# Patient Record
Sex: MALE | Race: OTHER | Employment: FULL TIME | URBAN - METROPOLITAN AREA
[De-identification: names, ages, dates, MRNs, and addresses within clinical notes are randomized per-mention and may not be internally consistent; named-entity substitution may affect disease eponyms.]

---

## 2019-04-24 ENCOUNTER — HOSPITAL ENCOUNTER (OUTPATIENT)
Dept: LAB | Age: 24
Discharge: HOME | End: 2019-04-24

## 2019-09-01 ENCOUNTER — APPOINTMENT (OUTPATIENT)
Dept: GENERAL RADIOLOGY | Age: 24
End: 2019-09-01
Attending: EMERGENCY MEDICINE
Payer: COMMERCIAL

## 2019-09-01 ENCOUNTER — HOSPITAL ENCOUNTER (EMERGENCY)
Age: 24
Discharge: HOME | End: 2019-09-01
Attending: EMERGENCY MEDICINE
Payer: COMMERCIAL

## 2019-09-01 VITALS
TEMPERATURE: 98.2 F | HEIGHT: 67 IN | BODY MASS INDEX: 23.81 KG/M2 | DIASTOLIC BLOOD PRESSURE: 68 MMHG | HEART RATE: 70 BPM | WEIGHT: 151.68 LBS | OXYGEN SATURATION: 98 % | RESPIRATION RATE: 12 BRPM | SYSTOLIC BLOOD PRESSURE: 125 MMHG

## 2019-09-01 DIAGNOSIS — S80.02XA CONTUSION OF LEFT KNEE, INITIAL ENCOUNTER: Primary | ICD-10-CM

## 2019-09-01 PROCEDURE — 73562 X-RAY EXAM OF KNEE 3: CPT

## 2019-09-01 PROCEDURE — 73610 X-RAY EXAM OF ANKLE: CPT

## 2019-09-01 PROCEDURE — 99282 EMERGENCY DEPT VISIT SF MDM: CPT

## 2019-09-01 RX ORDER — IBUPROFEN 600 MG/1
600 TABLET ORAL
Qty: 20 TAB | Refills: 0 | Status: SHIPPED | OUTPATIENT
Start: 2019-09-01

## 2019-09-01 NOTE — DISCHARGE INSTRUCTIONS
Patient Education        Contusion: Care Instructions  Your Care Instructions    Contusion is the medical term for a bruise. It is the result of a direct blow or an impact, such as a fall. Contusions are common sports injuries. Most people think of a bruise as a black-and-blue spot. This happens when small blood vessels get torn and leak blood under the skin. But bones, muscles, and organs can also get bruised. This may damage deep tissues but not cause a bruise you can see. The doctor will do a physical exam to find the location of your contusion. You may also have tests to make sure you do not have a more serious injury, such as a broken bone or nerve damage. These may include X-rays or other imaging tests like a CT scan or MRI. Deep-tissue contusions may cause pain and swelling. But if there is no serious damage, they will often get better in a few weeks with home treatment. The doctor has checked you carefully, but problems can develop later. If you notice any problems or new symptoms, get medical treatment right away. Follow-up care is a key part of your treatment and safety. Be sure to make and go to all appointments, and call your doctor if you are having problems. It's also a good idea to know your test results and keep a list of the medicines you take. How can you care for yourself at home? · Put ice or a cold pack on the sore area for 10 to 20 minutes at a time to stop swelling. Put a thin cloth between the ice pack and your skin. · Be safe with medicines. Read and follow all instructions on the label. ? If the doctor gave you a prescription medicine for pain, take it as prescribed. ? If you are not taking a prescription pain medicine, ask your doctor if you can take an over-the-counter medicine. · If you can, prop up the sore area on pillows as much as possible for the next few days. Try to keep the sore area above the level of your heart. When should you call for help?   Call your doctor now or seek immediate medical care if:    · Your pain gets worse.     · You have new or worse swelling.     · You have tingling, weakness, or numbness in the area near the contusion.     · The area near the contusion is cold or pale.    Watch closely for changes in your health, and be sure to contact your doctor if:    · You do not get better as expected. Where can you learn more? Go to http://haroon-gabby.info/. Enter O744 in the search box to learn more about \"Contusion: Care Instructions. \"  Current as of: September 23, 2018  Content Version: 12.1  © 5781-9435 Healthwise, [employer]. Care instructions adapted under license by [employer] (which disclaims liability or warranty for this information). If you have questions about a medical condition or this instruction, always ask your healthcare professional. [address] any warranty or liability for your use of this information.
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
## 2019-09-01 NOTE — ED NOTES
Left knee wrapped with an ace wrap as ordered. The patient was discharged home by Dr Basil Chi in stable condition. The patient is alert and oriented, in no respiratory distress and discharge vital signs obtained. The patient's diagnosis, condition and treatment were explained. The patient expressed understanding. One prescription given. A discharge plan has been developed. Aftercare instructions were given. Pt ambulatory out of the ED.

## 2020-08-02 ENCOUNTER — APPOINTMENT (OUTPATIENT)
Dept: GENERAL RADIOLOGY | Age: 25
End: 2020-08-02
Attending: EMERGENCY MEDICINE
Payer: COMMERCIAL

## 2020-08-02 ENCOUNTER — HOSPITAL ENCOUNTER (EMERGENCY)
Age: 25
Discharge: HOME | End: 2020-08-03
Attending: EMERGENCY MEDICINE
Payer: COMMERCIAL
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
## 2020-08-02 DIAGNOSIS — S41.111A ARM LACERATION, RIGHT, INITIAL ENCOUNTER: Primary | ICD-10-CM

## 2020-08-02 PROCEDURE — 74011000250 HC RX REV CODE- 250: Performed by: EMERGENCY MEDICINE

## 2020-08-02 PROCEDURE — 99283 EMERGENCY DEPT VISIT LOW MDM: CPT

## 2020-08-02 PROCEDURE — 73060 X-RAY EXAM OF HUMERUS: CPT

## 2020-08-02 PROCEDURE — 75810000294 HC INTERM/LAYERED WND RPR

## 2020-08-02 RX ORDER — LIDOCAINE HYDROCHLORIDE AND EPINEPHRINE 10; 10 MG/ML; UG/ML
1.5 INJECTION, SOLUTION INFILTRATION; PERINEURAL
Status: COMPLETED | OUTPATIENT
Start: 2020-08-02 | End: 2020-08-02

## 2020-08-02 RX ORDER — BACITRACIN 500 UNIT/G
1 PACKET (EA) TOPICAL
Status: COMPLETED | OUTPATIENT
Start: 2020-08-03 | End: 2020-08-03

## 2020-08-02 RX ADMIN — LIDOCAINE HYDROCHLORIDE,EPINEPHRINE BITARTRATE 15 MG: 10; .01 INJECTION, SOLUTION INFILTRATION; PERINEURAL at 22:59

## 2020-08-03 VITALS
DIASTOLIC BLOOD PRESSURE: 78 MMHG | OXYGEN SATURATION: 95 % | RESPIRATION RATE: 16 BRPM | HEIGHT: 71 IN | BODY MASS INDEX: 22.62 KG/M2 | WEIGHT: 161.6 LBS | SYSTOLIC BLOOD PRESSURE: 130 MMHG | TEMPERATURE: 98.2 F | HEART RATE: 98 BPM

## 2020-08-03 PROCEDURE — 74011000250 HC RX REV CODE- 250: Performed by: EMERGENCY MEDICINE

## 2020-08-03 RX ADMIN — BACITRACIN 1 PACKET: 500 OINTMENT TOPICAL at 00:15
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
## 2020-08-03 NOTE — ED NOTES
Dr. Dot De Leon reviewed discharge instructions with the patient. The patient verbalized understanding. Patient ambulated out of the emergency department escorted by his father. Patient is free of pain. Patient is in no apparent distress.

## 2020-08-03 NOTE — ED NOTES
Patient resting on the stretcher in no apparent distress. Patient has no pain. Patient comfortable in bed. Patient does not need to use the bathroom. Patient does not have IV fluids or medication running.       The patient was updated on the plan of care and all questions were answered. The patient's needs were assessed and the patient has no needs at this time. Patient's bed is in low position and locked, call bell and personal belongings are within reach and patient has been instructed on it's use. Area around bed is free of clutter and floor is clean.
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
## 2020-08-03 NOTE — ED NOTES
Patient resting on the stretcher in no apparent distress. Patient has no pain. Patient comfortable in bed. Patient does not need to use the bathroom. Patient does not have IV fluids or medication running. The patient was updated on the plan of care and all questions were answered. The patient's needs were assessed and the patient has no needs at this time. X-ray called for imaging. Patient's bed is in low position and locked, call bell and personal belongings are within reach and patient has been instructed on it's use. Area around bed is free of clutter and floor is clean.

## 2020-08-15 ENCOUNTER — HOSPITAL ENCOUNTER (EMERGENCY)
Age: 25
Discharge: HOME | End: 2020-08-15
Attending: EMERGENCY MEDICINE

## 2020-08-15 VITALS
RESPIRATION RATE: 16 BRPM | HEART RATE: 71 BPM | WEIGHT: 162.7 LBS | SYSTOLIC BLOOD PRESSURE: 135 MMHG | OXYGEN SATURATION: 100 % | DIASTOLIC BLOOD PRESSURE: 84 MMHG | BODY MASS INDEX: 22.69 KG/M2 | TEMPERATURE: 98.2 F

## 2020-08-15 DIAGNOSIS — Z48.02 ENCOUNTER FOR REMOVAL OF SUTURES: Primary | ICD-10-CM

## 2020-08-16 NOTE — ED NOTES
Patient given discharge papers and instructions by primary RN. Patient verbalized understanding and stated not having any questions or concerns regarding his care. Patient ambulatory out of ED with mother.
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
## 2020-08-16 NOTE — DISCHARGE INSTRUCTIONS
We hope that we have addressed all of your medical concerns. The examination and treatment you received in the Emergency Department were for an emergent problem and were not intended as complete care. It is important that you follow up with your healthcare provider(s) for ongoing care. If your symptoms worsen or do not improve as expected, and you are unable to reach your usual health care provider(s), you should return to the Emergency Department. Today's healthcare is undergoing tremendous change, and patient satisfaction surveys are one of the many tools to assess the quality of medical care. You may receive a survey from the [employer] regarding your experience in the Emergency Department. I hope that your experience has been completely positive, particularly the medical care that I provided. As such, please participate in the survey; anything less than excellent does not meet my expectations or intentions. [facility] and [address] [place] [address] participate in nationally recognized quality of care measures. If your blood pressure is greater than 120/80, as reported below, we urge that you seek medical care to address the potential of high blood pressure, commonly known as hypertension. Hypertension can be hereditary or can be caused by certain medical conditions, pain, stress, or \"white coat syndrome. \"       Please make an appointment with your health care provider(s) for follow up of your Emergency Department visit. VITALS:   Patient Vitals for the past 8 hrs:   Temp Pulse Resp BP SpO2   08/15/20 2009 98.2 °F (36.8 °C) 71 16 135/84 100 %          Thank you for allowing us to provide you with medical care today. We realize that you have many choices for your emergency care needs. Please choose us in the future for any continued health care needs. [employer]  Rick Mcdermott, [address]. Office: [phone]            No results found for this or any previous visit (from the past 24 hour(s)). No results found. Patient Education        Learning About Stitches and Staples Removal  When are stitches and staples removed? Your doctor will tell you when to have your stitches or staples removed, usually in 7 to 14 days. How long you'll be told to wait will depend on things like where the wound is located, how big and how deep the wound is, and what your general health is like. Do not remove the stitches on your own. Stitches on the face are usually removed within a week. But stitches and staples on other areas of the body, such as on the back or belly or over a joint, may need to stay in place longer, often a week or two. Be sure to follow your doctor's instructions. How are stitches and staples removed? It usually doesn't hurt when the doctor removes the stitches or staples. You may feel a tug as each stitch or staple is removed. · You will either be seated or lying down. · To remove stitches, the doctor will use scissors to cut each of the knots and then pull the threads out. · To remove staples, the doctor will use a tool to take out the staples one at a time. · The area may still feel tender after the stitches or staples are gone. But it should feel better within a few minutes or up to a few hours. What can you expect after stitches and staples are removed? Depending on the type and location of the cut, you will have a scar. Scars usually fade over time. Keep the area clean, but you won't need a bandage. When should you call for help? Call your doctor now or seek immediate medical care if :  · You have new pain, or your pain gets worse. · You have trouble moving the area near the scar. · You have symptoms of infection, such as:  ? Increased pain, swelling, warmth, or redness around the scar. ? Red streaks leading from the scar. ? Pus draining from the scar. ? A fever.   Watch closely for changes in your health, and be sure to contact your doctor if:   · The scar opens. · You do not get better as expected. Follow-up care is a key part of your treatment and safety. Be sure to make and go to all appointments, and call your doctor if you do not get better as expected. It's also a good idea to keep a list of the medicines you take. Where can you learn more? Go to http://www.gray.com/  Enter L659 in the search box to learn more about \"Learning About Stitches and Staples Removal.\"  Current as of: June 26, 2019               Content Version: 12.5  © 9158-1759 Healthwise[employer]. Care instructions adapted under license by [employer] (which disclaims liability or warranty for this information). If you have questions about a medical condition or this instruction, always ask your healthcare professional. [address] any warranty or liability for your use of this information.

## 2020-08-16 NOTE — ED PROVIDER NOTES
This is a 26-year-old male comes emergency room with chief complaint of suture removal.  Patient states that he had a laceration on August 2 that was done here. Patient comes in for removal.  Patient denies any redness. Patient denies any pain or swelling to the area. Patient denies any fever or chills. The history is provided by the patient. No  was used. Suture Removal   This is a new problem. The current episode started more than 1 week ago. The problem occurs constantly. The problem has not changed since onset. Pertinent negatives include no chest pain, no abdominal pain, no headaches and no shortness of breath. Nothing aggravates the symptoms. Nothing relieves the symptoms. He has tried nothing for the symptoms. Past Medical History:   Diagnosis Date    Psoriatic arthritis ([facility] [address]        Past Surgical History:   Procedure Laterality Date    HX KNEE ARTHROSCOPY           History reviewed. No pertinent family history.     Social History     Socioeconomic History    Marital status: SINGLE     Spouse name: Not on file    Number of children: Not on file    Years of education: Not on file    Highest education level: Not on file   Occupational History    Not on file   Social Needs    Financial resource strain: Not on file    Food insecurity     Worry: Not on file     Inability: Not on file    Transportation needs     Medical: Not on file     Non-medical: Not on file   Tobacco Use    Smoking status: Never Smoker    Smokeless tobacco: Never Used   Substance and Sexual Activity    Alcohol use: Never     Frequency: Never    Drug use: Never    Sexual activity: Not on file   Lifestyle    Physical activity     Days per week: Not on file     Minutes per session: Not on file    Stress: Not on file   Relationships    Social connections     Talks on phone: Not on file     Gets together: Not on file     Attends Tenriism service: Not on file     Active member of club or organization: Not on file     Attends meetings of clubs or organizations: Not on file     Relationship status: Not on file    Intimate partner violence     Fear of current or ex partner: Not on file     Emotionally abused: Not on file     Physically abused: Not on file     Forced sexual activity: Not on file   Other Topics Concern    Not on file   Social History Narrative    Not on file     ALLERGIES: Patient has no known allergies. Review of Systems   Constitutional: Negative for appetite change, chills, fever and unexpected weight change. HENT: Negative for ear pain, hearing loss, rhinorrhea and trouble swallowing. Eyes: Negative for pain and visual disturbance. Respiratory: Negative for cough, chest tightness and shortness of breath. Cardiovascular: Negative for chest pain and palpitations. Gastrointestinal: Negative for abdominal distention, abdominal pain, blood in stool and vomiting. Genitourinary: Negative for dysuria, hematuria and urgency. Musculoskeletal: Negative for back pain and myalgias. Skin: Positive for wound. Negative for rash. Neurological: Negative for dizziness, syncope, weakness, numbness and headaches. Psychiatric/Behavioral: Negative for confusion and suicidal ideas. All other systems reviewed and are negative. Vitals:    08/15/20 2009   BP: 135/84   Pulse: 71   Resp: 16   Temp: 98.2 °F (36.8 °C)   SpO2: 100%   Weight: 73.8 kg (162 lb 11.2 oz)            Physical Exam  Vitals signs and nursing note reviewed. Constitutional:       General: He is not in acute distress. Appearance: Normal appearance. He is well-developed. He is not diaphoretic. HENT:      Head: Normocephalic and atraumatic. Right Ear: External ear normal.      Left Ear: External ear normal.   Eyes:      General: No scleral icterus. Right eye: No discharge. Left eye: No discharge. Extraocular Movements: Extraocular movements intact.       Conjunctiva/sclera: Conjunctivae normal.      Pupils: Pupils are equal, round, and reactive to light. Neck:      Musculoskeletal: Normal range of motion and neck supple. Vascular: No JVD. Trachea: No tracheal deviation. Cardiovascular:      Rate and Rhythm: Normal rate and regular rhythm. Pulmonary:      Effort: Pulmonary effort is normal.      Breath sounds: No decreased breath sounds. Musculoskeletal: Normal range of motion. General: No tenderness. Skin:     General: Skin is warm and dry. Capillary Refill: Capillary refill takes less than 2 seconds. Coloration: Skin is not pale. Findings: Wound present. No erythema or rash. Neurological:      General: No focal deficit present. Mental Status: He is alert and oriented to person, place, and time. GCS: GCS eye subscore is 4. GCS verbal subscore is 5. GCS motor subscore is 6. Cranial Nerves: No cranial nerve deficit. Sensory: No sensory deficit. Motor: No abnormal muscle tone. Coordination: Coordination normal.      Deep Tendon Reflexes: Reflexes are normal and symmetric. Reflexes normal.   Psychiatric:         Mood and Affect: Mood normal.         Behavior: Behavior normal.         Thought Content:  Thought content normal.         Judgment: Judgment normal.          MDM  Number of Diagnoses or Management Options  Encounter for removal of sutures:   Risk of Complications, Morbidity, and/or Mortality  Presenting problems: moderate  Diagnostic procedures: low  Management options: low    Patient Progress  Patient progress: stable       Suture/Staple Removal    Date/Time: 8/15/2020 8:22 PM  Performed by: Aline Hawk DO  Authorized by: Aline Hawk DO     Consent:     Consent obtained:  Verbal    Consent given by:  Patient    Risks discussed:  Bleeding, pain and wound separation    Alternatives discussed:  No treatment  Location:     Location:  Upper extremity    Upper extremity location: Arm    Arm location:  R upper arm  Procedure details:     Wound appearance:  No signs of infection    Number of sutures removed:  7  Post-procedure details:     Post-removal:  Dressing applied    Patient tolerance of procedure: Tolerated well, no immediate complications (10 minutes)      Chief Complaint   Patient presents with    Suture Removal       The patient's presenting problems have been discussed, and they are in agreement with the care plan formulated and outlined with them. I have encouraged them to ask questions as they arise throughout their visit. MEDICATIONS GIVEN:  Medications - No data to display    LABS REVIEWED:  No results found for this or any previous visit (from the past 24 hour(s)). VITAL SIGNS:  Patient Vitals for the past 24 hrs:   Temp Pulse Resp BP SpO2   08/15/20 2009 98.2 °F (36.8 °C) 71 16 135/84 100 %       RADIOLOGY RESULTS:  The following have been ordered and reviewed:  No results found. PROCEDURES:  Suture removal right upper extremity. 7 sutures removed. PROGRESS NOTES:  Discussed results and plan with patient. Patient will be discharged home with PCP follow up. Patient instructed to return to the emergency room for any worsening symptoms or any other concerns. DIAGNOSIS:    1. Encounter for removal of sutures        PLAN:  Follow-up Information     Follow up With Specialties Details Why Contact Info    your doctor   As needed     [facility] [address] [facility]  If symptoms worsen [address] [id number] [phone]        Current Discharge Medication List      CONTINUE these medications which have NOT CHANGED    Details   lisdexamfetamine (VYVANSE) 50 mg cap Take  by mouth daily. ibuprofen (MOTRIN) 600 mg tablet Take 1 Tab by mouth every six (6) hours as needed for Pain. Qty: 20 Tab, Refills: 0             ED COURSE: The patient's hospital course has been uncomplicated.

## 2020-10-30 ENCOUNTER — OFFICE VISIT (OUTPATIENT)
Dept: CARDIOLOGY CLINIC | Age: 25
End: 2020-10-30
Payer: COMMERCIAL

## 2020-10-30 VITALS
HEIGHT: 69 IN | BODY MASS INDEX: 23.25 KG/M2 | WEIGHT: 157 LBS | SYSTOLIC BLOOD PRESSURE: 110 MMHG | OXYGEN SATURATION: 98 % | HEART RATE: 61 BPM | RESPIRATION RATE: 16 BRPM | DIASTOLIC BLOOD PRESSURE: 70 MMHG

## 2020-10-30 DIAGNOSIS — R01.1 MURMUR, HEART: Primary | ICD-10-CM

## 2020-10-30 PROCEDURE — 93000 ELECTROCARDIOGRAM COMPLETE: CPT | Performed by: SPECIALIST

## 2020-10-30 RX ORDER — ADALIMUMAB 40MG/0.4ML
KIT SUBCUTANEOUS
COMMUNITY
Start: 2020-10-20

## 2020-10-30 NOTE — PROGRESS NOTES
Visit Vitals  /70 (BP 1 Location: Left arm, BP Patient Position: Sitting)   Pulse 61   Resp 16   Ht 5' 9\" (1.753 m)   Wt 157 lb (71.2 kg)   SpO2 98%   BMI 23.18 kg/m²

## 2020-10-30 NOTE — Clinical Note
10/30/20 Patient: Tahir Canales YOB: 1995 Date of Visit: 10/30/2020 Christie Ybarra MD 
[address]  [facility] [address] VIA Facsimile: [phone] Dear Christie Ybarra MD, Thank you for referring Mr. Tahir Canales to [address]  for evaluation. My notes for this consultation are attached. If you have questions, please do not hesitate to call me. I look forward to following your patient along with you.  
 
 
Sincerely, 
 
Silvina Mendoza MD

## 2020-10-30 NOTE — PROGRESS NOTES
Patient arrived in chart it was noted that he had a pending Covid test.  We spoke to the patient he said that he had a pending Covid test screening as he has psoriatic arthritis but had possibly been exposed. I spoke to patient and recommended that we change to a virtual visit since it was nonemergent patient agreed to reschedule as a virtual visit and then decide if necessary for echo. Records have been received patient sees Dr. Jenene Homans and had telemedicine visit there 26. Noted history of psoriatic arthritis hyperlipidemia and benign heart murmur. We believe the [facility] more recently was recommended to have echo lab work 10/28/2020 with this PCP showed creatinine of 0.72 cholesterol 196 triglycerides 115  HDL 45  No office visit today as patient was not seen but will be rescheduled for televisit.

## 2020-11-20 ENCOUNTER — TELEPHONE (OUTPATIENT)
Dept: CARDIOLOGY CLINIC | Age: 25
End: 2020-11-20

## 2020-11-20 ENCOUNTER — VIRTUAL VISIT (OUTPATIENT)
Dept: CARDIOLOGY CLINIC | Age: 25
End: 2020-11-20
Payer: COMMERCIAL

## 2020-11-20 DIAGNOSIS — R01.1 MURMUR, HEART: Primary | ICD-10-CM

## 2020-11-20 PROCEDURE — 99203 OFFICE O/P NEW LOW 30 MIN: CPT | Performed by: SPECIALIST

## 2020-11-20 NOTE — PROGRESS NOTES
Padmini Crespo     1995       Anjum Franks MD, [facility]  Date of Visit-11/20/2020   PCP is Anirudh Joseph MD   [facility] [place]  [facility]  Virtual Visit  HPI:  Padmini Crespo is a 31 y.o. male   who was seen by synchronous (real-time) audio-video technology on 11/20/2020. Pt referred as an outpatient for a murmur. Hx includes psoriatic arthritis. Pt had a fall and laceration after striking his car window on August 2nd of the right arm. He is on Humira. Pt has had COVID testing because of his risk on 10/13, 10/21, and 10/28. Now that he is getting more physically active, pt wanted to come see a cardiologist to ensure that this would not be a problem considering his heart murmur. Pt states that he has occasional sharp pains in his chest. He states he sometimes notices edema in his joints. He denies any unusual SOB. He had a recent fracture to his left wrist. He was diagnosed with psoriatic arthritis last year, but was diagnosed with psoriasis a few years ago. He states that in the last two years he states his psoriasis spread throughout his body and affected his hands. Now that he is on Humira and has changed his diet his psoriasis has improved. He was diagnosed with a murmur in 7th grade and was told it was benign. His only surgical hx is an arthroscopy of the knee. He states his TC went from 251 to 196. Pt is a vegetarian. Pt is a nonsmoker. Pt denies any family health issues. No specialty comments available. Assessment/Plan:       Pt came in for a visit because of a referral for murmur. He had a pending COVID test with no acuities so we deferred to a pending VV, which was done today. This shows him to have no CV sx's. This is a little late for a flow murmur to persist without investigation. Given his psoriatic arthritis I would suggest we get an echo to look for pulmonary HTN or valvular disease. We will obtain an echo and call pt with results. If normal then no further work up needed. This note was created using voice recognition software. Despite editing, there may be syntax errors. Key CAD CHF Meds     Patient is on no cardiovascular meds. ROS-except as noted above. . A complete cardiac and respiratory are reviewed and negative except as above ; Resp-denies wheezing  or productive cough,. Const- No unusual weight loss or fever; Neuro-no recent seizure or CVA ; GI- No BRBPR, abdom pain, bloating ; - no  hematuria   Past Medical History:   Diagnosis Date    Psoriatic arthritis ([facility] [address]       Social Hx= reports that he has never smoked. He has never used smokeless tobacco. He reports that he does not drink alcohol or use drugs. Due to this being a TeleHealth evaluation, many elements of the physical examination are unable to be assessed. Vitals if sent, or see HPI  There were no vitals taken for this visit. General: Well developed, in no acute distress, cooperative and alert  HEENT: Pupils equal/round. No marked JVD visible on video. Respiratory: No audible wheezing, no signs of respiratory distress, lips non cyanotic  Extremities:  No edema  Neuro: A&Ox3, speech clear, no facial droop, answering questions appropriately  Skin: Skin color is normal. No rashes or lesions.  Non diaphoretic on visible skin during exam  Psych: mood and affect are appropriate and pleasant    No results found for: CHOL, CHOLX, CHLST, CHOLV, HDL, HDLP, LDL, LDLC, DLDLP, TGLX, TRIGL, TRIGP, CHHD, CHHDX  No results found for: NA, K, CL, CO2, AGAP, GLU, BUN, CREA, BUCR, GFRAA, GFRNA, CA, GFRAA   Wt Readings from Last 3 Encounters:   10/30/20 157 lb (71.2 kg)   08/15/20 162 lb 11.2 oz (73.8 kg)   08/02/20 161 lb 9.6 oz (73.3 kg)      BP Readings from Last 3 Encounters:   10/30/20 110/70   08/15/20 135/84   08/03/20 130/78        Current Outpatient Medications   Medication Sig    adalimumab (Humira,CF, Pen) 40 mg/0.4 mL injection pen     lisdexamfetamine (VYVANSE) 50 mg cap Take  by mouth daily.  ibuprofen (MOTRIN) 600 mg tablet Take 1 Tab by mouth every six (6) hours as needed for Pain. No current facility-administered medications for this visit. Impression see above. VIRTUAL VISIT DOCUMENTATION   Pursuant to the emergency declaration under the [facility] waiver authority and the [employer] Act, this Virtual  Visit was conducted, with patient's consent, to reduce the patient's risk of exposure to COVID-19 and provide continuity of care for an established patient. Services were provided through a video synchronous discussion virtually to substitute for in-person clinic visit. We discussed the expected course, resolution and complications of the diagnosis(es) in detail. Medication risks, benefits, costs, interactions, and alternatives were discussed as indicated. I advised him to contact the office if his condition worsens, changes or fails to improve as anticipated. He expressed understanding with the diagnosis(es) and plan  I have reviewed the nurses notes, vitals, problem list, allergy list, medical history, family, social history and medications. FOLLOW-UP   Patient was made aware and verbalized understanding that an appointment will be scheduled for them for a virtual visit and/or office visit within the above time frame. Patient understanding his/her responsibility to call and change time/date if he/she so chooses. [address], [address]  [phone] / [phone] Fax  [phone] / [phone] Fax  This visit was conducted using [employer] services or similar service.

## 2020-11-20 NOTE — TELEPHONE ENCOUNTER
Don't see that he has rescheduled. He needs an echo and can VV or OV , if no response then send a letter to ask to schedule both. If he is getting Covid as screening and not having symptoms then can proceed with testing and visits as long as previous two are negative even if one pending. Letter to pt either way to ask to schedule echo for murmur and VV or OV. I know we have tried reaching out to phone number before.

## 2020-11-22 NOTE — PROGRESS NOTES
New patient form indicates reason for referral was murmur and EKG. Patient takes Vynase and Humira    Allergies none    Knee arthroscopy May 2019    Non-smoker no alcohol no caffeine unemployed currently lives with parents enjoys tennis    Family history mother 65-60 in good health 2 sisters 29 and 32 in good health    Review of systems positive chest pain shortness of breath irregular heartbeat fast heartbeat headache.   Remainder of systems are reviewed and negative

## 2020-11-25 ENCOUNTER — ANCILLARY PROCEDURE (OUTPATIENT)
Dept: CARDIOLOGY CLINIC | Age: 25
End: 2020-11-25
Payer: COMMERCIAL

## 2020-11-25 VITALS — WEIGHT: 157 LBS | HEIGHT: 69 IN | BODY MASS INDEX: 23.25 KG/M2

## 2020-11-25 DIAGNOSIS — R01.1 MURMUR, HEART: ICD-10-CM

## 2020-11-25 PROCEDURE — 93306 TTE W/DOPPLER COMPLETE: CPT | Performed by: SPECIALIST

## 2020-11-29 LAB
ECHO AO ASC DIAM: 2.46 CM
ECHO AO ROOT DIAM: 2.79 CM
ECHO AV AREA PEAK VELOCITY: 1.84 CM2
ECHO AV AREA VTI: 1.67 CM2
ECHO AV AREA/BSA PEAK VELOCITY: 1 CM2/M2
ECHO AV AREA/BSA VTI: 0.9 CM2/M2
ECHO AV MEAN GRADIENT: 5.96 MMHG
ECHO AV PEAK GRADIENT: 10.04 MMHG
ECHO AV PEAK VELOCITY: 158.44 CM/S
ECHO AV VTI: 31.91 CM
ECHO IVC PROX: 2.01 CM
ECHO LA AREA 4C: 14.69 CM2
ECHO LA MAJOR AXIS: 3.25 CM
ECHO LA MINOR AXIS: 1.74 CM
ECHO LA VOL 2C: 43.8 ML (ref 18–58)
ECHO LA VOL 4C: 32.71 ML (ref 18–58)
ECHO LA VOL BP: 41.44 ML (ref 18–58)
ECHO LA VOL/BSA BIPLANE: 22.24 ML/M2 (ref 16–28)
ECHO LA VOLUME INDEX A2C: 23.5 ML/M2 (ref 16–28)
ECHO LA VOLUME INDEX A4C: 17.55 ML/M2 (ref 16–28)
ECHO LV E' LATERAL VELOCITY: 15.46 CM/S
ECHO LV E' SEPTAL VELOCITY: 10.71 CM/S
ECHO LV EDV A2C: 95.62 ML
ECHO LV EDV A4C: 88.63 ML
ECHO LV EDV BP: 96.1 ML (ref 67–155)
ECHO LV EDV INDEX A4C: 47.6 ML/M2
ECHO LV EDV INDEX BP: 51.6 ML/M2
ECHO LV EDV NDEX A2C: 51.3 ML/M2
ECHO LV EJECTION FRACTION A2C: 51 PERCENT
ECHO LV EJECTION FRACTION A4C: 52 PERCENT
ECHO LV EJECTION FRACTION BIPLANE: 53 PERCENT (ref 55–100)
ECHO LV ESV A2C: 46.75 ML
ECHO LV ESV A4C: 42.78 ML
ECHO LV ESV BP: 45.15 ML (ref 22–58)
ECHO LV ESV INDEX A2C: 25.1 ML/M2
ECHO LV ESV INDEX A4C: 23 ML/M2
ECHO LV ESV INDEX BP: 24.2 ML/M2
ECHO LV INTERNAL DIMENSION DIASTOLIC: 4.66 CM (ref 4.2–5.9)
ECHO LV INTERNAL DIMENSION SYSTOLIC: 3.23 CM
ECHO LV IVSD: 0.99 CM (ref 0.6–1)
ECHO LV MASS 2D: 172.3 G (ref 88–224)
ECHO LV MASS INDEX 2D: 92.4 G/M2 (ref 49–115)
ECHO LV POSTERIOR WALL DIASTOLIC: 1.1 CM (ref 0.6–1)
ECHO LVOT DIAM: 1.98 CM
ECHO LVOT PEAK GRADIENT: 3.56 MMHG
ECHO LVOT PEAK VELOCITY: 94.37 CM/S
ECHO LVOT SV: 53.2 ML
ECHO LVOT VTI: 17.21 CM
ECHO MV A VELOCITY: 39.66 CM/S
ECHO MV E DECELERATION TIME (DT): 204.5 MS
ECHO MV E VELOCITY: 93.82 CM/S
ECHO MV E/A RATIO: 2.37
ECHO MV E/E' LATERAL: 6.07
ECHO MV E/E' RATIO (AVERAGED): 7.41
ECHO MV E/E' SEPTAL: 8.76
ECHO PV MAX VELOCITY: 102.91 CM/S
ECHO PV PEAK INSTANTANEOUS GRADIENT SYSTOLIC: 4.24 MMHG
ECHO PV REGURGITANT MAX VELOCITY: 76.94 CM/S
ECHO RA MINOR AXIS: 3.77 CM
ECHO RV INTERNAL DIMENSION: 3.92 CM
ECHO RV TAPSE: 2.72 CM (ref 1.5–2)
ECHO TV REGURGITANT MAX VELOCITY: 248.47 CM/S
ECHO TV REGURGITANT PEAK GRADIENT: 24.69 MMHG
LA VOL DISK BP: 38.57 ML (ref 18–58)

## 2020-11-30 ENCOUNTER — TELEPHONE (OUTPATIENT)
Dept: CARDIOLOGY CLINIC | Age: 25
End: 2020-11-30

## 2020-11-30 NOTE — PROGRESS NOTES
Good news, Echo is normal, the murmur is just likely a benign flow murmur tends to go away later in life  No fu needed unless having symptoms  Fax echo report  to PCP and [facility]

## 2020-11-30 NOTE — TELEPHONE ENCOUNTER
----- Message from Lillian Mcfarland MD sent at 11/30/2020 11:59 AM EST -----  Good news, Echo is normal, the murmur is just likely a benign flow murmur tends to go away later in life  No fu needed unless having symptoms  Fax echo report  to PCP and [facility]

## 2020-11-30 NOTE — TELEPHONE ENCOUNTER
Verified patient with two types of identifiers. Notified patient of results and MD recommendations. Patient requests I sent results to his home address. Verified address. Patient verbalized understanding and will call with any other questions.

## 2022-10-22 ENCOUNTER — APPOINTMENT (OUTPATIENT)
Dept: CT IMAGING | Age: 27
End: 2022-10-22
Attending: STUDENT IN AN ORGANIZED HEALTH CARE EDUCATION/TRAINING PROGRAM
Payer: COMMERCIAL

## 2022-10-22 ENCOUNTER — HOSPITAL ENCOUNTER (EMERGENCY)
Age: 27
Discharge: HOME | End: 2022-10-22
Attending: STUDENT IN AN ORGANIZED HEALTH CARE EDUCATION/TRAINING PROGRAM | Admitting: STUDENT IN AN ORGANIZED HEALTH CARE EDUCATION/TRAINING PROGRAM
Payer: COMMERCIAL

## 2022-10-22 VITALS
RESPIRATION RATE: 18 BRPM | OXYGEN SATURATION: 99 % | BODY MASS INDEX: 22.2 KG/M2 | HEART RATE: 70 BPM | WEIGHT: 149.91 LBS | HEIGHT: 69 IN | TEMPERATURE: 98.1 F | SYSTOLIC BLOOD PRESSURE: 127 MMHG | DIASTOLIC BLOOD PRESSURE: 78 MMHG

## 2022-10-22 DIAGNOSIS — Z72.820 SLEEP DEPRIVATION: ICD-10-CM

## 2022-10-22 DIAGNOSIS — R55 SYNCOPE AND COLLAPSE: Primary | ICD-10-CM

## 2022-10-22 LAB
ALBUMIN SERPL-MCNC: 4.4 G/DL (ref 3.5–5)
ALBUMIN/GLOB SERPL: 1.4 {RATIO} (ref 1.1–2.2)
ALP SERPL-CCNC: 68 U/L (ref 45–117)
ALT SERPL-CCNC: 29 U/L (ref 12–78)
AMPHET UR QL SCN: NEGATIVE
ANION GAP SERPL CALC-SCNC: 7 MMOL/L (ref 5–15)
AST SERPL-CCNC: 14 U/L (ref 15–37)
ATRIAL RATE: 72 BPM
BARBITURATES UR QL SCN: NEGATIVE
BASOPHILS # BLD: 0.1 K/UL (ref 0–0.1)
BASOPHILS NFR BLD: 1 % (ref 0–1)
BENZODIAZ UR QL: NEGATIVE
BILIRUB SERPL-MCNC: 0.3 MG/DL (ref 0.2–1)
BUN SERPL-MCNC: 18 MG/DL (ref 6–20)
BUN/CREAT SERPL: 20 (ref 12–20)
CALCIUM SERPL-MCNC: 9.5 MG/DL (ref 8.5–10.1)
CALCULATED P AXIS, ECG09: 49 DEGREES
CALCULATED R AXIS, ECG10: 31 DEGREES
CALCULATED T AXIS, ECG11: 33 DEGREES
CANNABINOIDS UR QL SCN: POSITIVE
CHLORIDE SERPL-SCNC: 104 MMOL/L (ref 97–108)
CO2 SERPL-SCNC: 31 MMOL/L (ref 21–32)
COCAINE UR QL SCN: NEGATIVE
COMMENT, HOLDF: NORMAL
CREAT SERPL-MCNC: 0.88 MG/DL (ref 0.7–1.3)
DIAGNOSIS, 93000: NORMAL
DIFFERENTIAL METHOD BLD: ABNORMAL
DRUG SCRN COMMENT,DRGCM: ABNORMAL
EOSINOPHIL # BLD: 0.1 K/UL (ref 0–0.4)
EOSINOPHIL NFR BLD: 1 % (ref 0–7)
ERYTHROCYTE [DISTWIDTH] IN BLOOD BY AUTOMATED COUNT: 12.9 % (ref 11.5–14.5)
ETHANOL SERPL-MCNC: <10 MG/DL
GLOBULIN SER CALC-MCNC: 3.2 G/DL (ref 2–4)
GLUCOSE SERPL-MCNC: 73 MG/DL (ref 65–100)
HCT VFR BLD AUTO: 44.7 % (ref 36.6–50.3)
HGB BLD-MCNC: 14.3 G/DL (ref 12.1–17)
IMM GRANULOCYTES # BLD AUTO: 0 K/UL (ref 0–0.04)
IMM GRANULOCYTES NFR BLD AUTO: 0 % (ref 0–0.5)
LYMPHOCYTES # BLD: 2.6 K/UL (ref 0.8–3.5)
LYMPHOCYTES NFR BLD: 20 % (ref 12–49)
MCH RBC QN AUTO: 27.2 PG (ref 26–34)
MCHC RBC AUTO-ENTMCNC: 32 G/DL (ref 30–36.5)
MCV RBC AUTO: 85.1 FL (ref 80–99)
METHADONE UR QL: NEGATIVE
MONOCYTES # BLD: 0.9 K/UL (ref 0–1)
MONOCYTES NFR BLD: 7 % (ref 5–13)
NEUTS SEG # BLD: 9.3 K/UL (ref 1.8–8)
NEUTS SEG NFR BLD: 71 % (ref 32–75)
NRBC # BLD: 0 K/UL (ref 0–0.01)
NRBC BLD-RTO: 0 PER 100 WBC
OPIATES UR QL: NEGATIVE
P-R INTERVAL, ECG05: 146 MS
PCP UR QL: NEGATIVE
PLATELET # BLD AUTO: 295 K/UL (ref 150–400)
PMV BLD AUTO: 12 FL (ref 8.9–12.9)
POTASSIUM SERPL-SCNC: 4.4 MMOL/L (ref 3.5–5.1)
PROT SERPL-MCNC: 7.6 G/DL (ref 6.4–8.2)
Q-T INTERVAL, ECG07: 374 MS
QRS DURATION, ECG06: 96 MS
QTC CALCULATION (BEZET), ECG08: 409 MS
RBC # BLD AUTO: 5.25 M/UL (ref 4.1–5.7)
SAMPLES BEING HELD,HOLD: NORMAL
SODIUM SERPL-SCNC: 142 MMOL/L (ref 136–145)
TROPONIN-HIGH SENSITIVITY: 10 NG/L (ref 0–76)
TROPONIN-HIGH SENSITIVITY: 9 NG/L (ref 0–76)
VENTRICULAR RATE, ECG03: 72 BPM
WBC # BLD AUTO: 13 K/UL (ref 4.1–11.1)

## 2022-10-22 PROCEDURE — 99284 EMERGENCY DEPT VISIT MOD MDM: CPT | Performed by: STUDENT IN AN ORGANIZED HEALTH CARE EDUCATION/TRAINING PROGRAM

## 2022-10-22 PROCEDURE — 82077 ASSAY SPEC XCP UR&BREATH IA: CPT

## 2022-10-22 PROCEDURE — 80053 COMPREHEN METABOLIC PANEL: CPT

## 2022-10-22 PROCEDURE — 93005 ELECTROCARDIOGRAM TRACING: CPT

## 2022-10-22 PROCEDURE — 70450 CT HEAD/BRAIN W/O DYE: CPT

## 2022-10-22 PROCEDURE — 36415 COLL VENOUS BLD VENIPUNCTURE: CPT

## 2022-10-22 PROCEDURE — 80307 DRUG TEST PRSMV CHEM ANLYZR: CPT

## 2022-10-22 PROCEDURE — 84484 ASSAY OF TROPONIN QUANT: CPT

## 2022-10-22 PROCEDURE — 85025 COMPLETE CBC W/AUTO DIFF WBC: CPT

## 2022-10-22 NOTE — ROUTINE PROCESS
Emergency Room Nursing Note        Patient Name: Michaelle Carver      : 1995             MRN: [id number]      Chief Complaint: Dizziness, Fall, and Nausea      Admit Diagnosis: No admission diagnoses are documented for this encounter. Surgery: * No surgery found *            MD/RN reviewed discharge instructions and options with patient. Patient verbalized understanding. RN reviewed discharge instructions using teach back method. Patient ambulatory to exit without difficulty and no acute signs of distress. No complaints or needs expressed at this time. Patient counseled on medications prescribed at discharge (If prescribed). Vital signs stable. Patient to follow up with PCP/Specialist on the next business day for appointment. All questions answered by ER RN.           Lines:        Vitals: Patient Vitals for the past 12 hrs:   Temp Pulse Resp BP SpO2   10/22/22 0547 98.1 °F (36.7 °C) 70 18 127/78 99 %   10/22/22 0028 97.9 °F (36.6 °C) 72 18 134/82 100 %         Signed by: Jamaica Cao RN, BENITO, BSN, VIA [facility]                                              10/22/2022 at 5:48 AM

## 2022-10-22 NOTE — ED PROVIDER NOTES
EMERGENCY DEPARTMENT HISTORY AND PHYSICAL EXAM      Date: 10/22/2022  Patient Name: Ron Zhang    History of Presenting Illness     HPI: Ron Zhang, 31 y.o. male with past medical history of psoriatic arthritis on Humira, ADHD, presents to the ED with cc of syncope. Patient reports that he occurred while he was at a club tonight. States that he only had 1 drink prior to this occurring. Reports prodromal symptoms of lightheadedness followed by sensations of feeling warm and hot all over, along with nausea prior to passing out. States that he did hit his head. Patient reports waking up seconds after initial passing out episode. However, when he attempted to get up, he kept briefly passing out again. This occurred 3 more times until he finally fully regained consciousness and was able to get up. Patient denies associated symptoms of chest pain, palpitations, shortness of breath. He admits that he has been under increased stress over the past 2 days, and that he has not slept over the past 48 hours after pulling an all nighter to complete some work. Patient currently reports feeling well, and no longer has any symptoms of lightheadedness or dizziness. He tells me that a similar syncopal event occurred approximately 2 months prior, also after pulling an all nighter. Patient was not evaluated at that time. Patient denies personal history of CAD or cardiovascular risk factors. States that he did have a childhood benign murmur. This was evaluated in depth in 2020 with cardiology, and patient did undergo echocardiogram study- which was normal. He denies family history of early or sudden cardiac death. PCP: Maynard Olszewski, MD    No current facility-administered medications on file prior to encounter. Current Outpatient Medications on File Prior to Encounter   Medication Sig Dispense Refill    adalimumab (Humira,CF, Pen) 40 mg/0.4 mL injection pen       lisdexamfetamine (VYVANSE) 50 mg cap Take  by mouth daily. ibuprofen (MOTRIN) 600 mg tablet Take 1 Tab by mouth every six (6) hours as needed for Pain. 20 Tab 0       Past History     Past Medical History:  Past Medical History:   Diagnosis Date    Psoriatic arthritis ([address]        Past Surgical History:  Past Surgical History:   Procedure Laterality Date    HX KNEE ARTHROSCOPY         Family History:  No family history on file. Social History:  Social History     Tobacco Use    Smoking status: Never    Smokeless tobacco: Never   Substance Use Topics    Alcohol use: Never    Drug use: Never       Allergies:  No Known Allergies      Review of Systems   Review of Systems   Constitutional:  Negative for chills and fever. HENT:  Negative for congestion and rhinorrhea. Eyes:  Negative for visual disturbance. Respiratory:  Negative for chest tightness and shortness of breath. Cardiovascular:  Negative for chest pain and palpitations. Gastrointestinal:  Negative for abdominal pain, diarrhea, nausea and vomiting. Genitourinary:  Negative for dysuria, flank pain and hematuria. Musculoskeletal:  Negative for back pain and neck pain. Skin:  Negative for rash. Allergic/Immunologic: Negative for immunocompromised state. Neurological:  Positive for syncope. Negative for dizziness, speech difficulty, weakness and headaches. Hematological:  Negative for adenopathy. Psychiatric/Behavioral:  Negative for dysphoric mood and suicidal ideas. Physical Exam   Physical Exam  Vitals and nursing note reviewed. Constitutional:       General: He is sleeping. He is not in acute distress. Appearance: Normal appearance. He is normal weight. He is not ill-appearing or toxic-appearing. HENT:      Head: Normocephalic and atraumatic. Nose: Nose normal.      Mouth/Throat:      Mouth: Mucous membranes are moist.   Eyes:      Extraocular Movements: Extraocular movements intact. Pupils: Pupils are equal, round, and reactive to light.    Cardiovascular: Rate and Rhythm: Normal rate and regular rhythm. Pulses: Normal pulses. Pulmonary:      Effort: Pulmonary effort is normal. No respiratory distress. Breath sounds: Normal breath sounds. No stridor. No wheezing or rhonchi. Abdominal:      General: Abdomen is flat. There is no distension. Palpations: There is no mass. Tenderness: There is no abdominal tenderness. Musculoskeletal:         General: Normal range of motion. Cervical back: Normal range of motion and neck supple. Right lower leg: No edema. Left lower leg: No edema. Skin:     General: Skin is warm and dry. Neurological:      General: No focal deficit present. Mental Status: He is oriented to person, place, and time and easily aroused. Mental status is at baseline. Cranial Nerves: No cranial nerve deficit. Sensory: No sensory deficit. Motor: No weakness. Coordination: Coordination normal.      Gait: Gait normal.       Diagnostic Study Results     Labs -     Recent Results (from the past 48 hour(s))   EKG, 12 LEAD, INITIAL    Collection Time: 10/22/22 12:34 AM   Result Value Ref Range    Ventricular Rate 72 BPM    Atrial Rate 72 BPM    P-R Interval 146 ms    QRS Duration 96 ms    Q-T Interval 374 ms    QTC Calculation (Bezet) 409 ms    Calculated P Axis 49 degrees    Calculated R Axis 31 degrees    Calculated T Axis 33 degrees    Diagnosis       Normal sinus rhythm with sinus arrhythmia  Minimal voltage criteria for LVH, may be normal variant  No previous ECGs available  Confirmed by Nhi Blanco ([id number]) on 10/22/2022 6:33:51 PM     SAMPLES BEING HELD    Collection Time: 10/22/22 12:39 AM   Result Value Ref Range    SAMPLES BEING HELD RED,BLUE,PST,LAV     COMMENT        Add-on orders for these samples will be processed based on acceptable specimen integrity and analyte stability, which may vary by analyte.    ETHYL ALCOHOL    Collection Time: 10/22/22  2:30 AM   Result Value Ref Range ALCOHOL(ETHYL),SERUM <10 <10 MG/DL   CBC WITH AUTOMATED DIFF    Collection Time: 10/22/22  2:30 AM   Result Value Ref Range    WBC 13.0 (H) 4.1 - 11.1 K/uL    RBC 5.25 4. 10 - 5.70 M/uL    HGB 14.3 12.1 - 17.0 g/dL    HCT 44.7 36.6 - 50.3 %    MCV 85.1 80.0 - 99.0 FL    MCH 27.2 26.0 - 34.0 PG    MCHC 32.0 30.0 - 36.5 g/dL    RDW 12.9 11.5 - 14.5 %    PLATELET 078 708 - 941 K/uL    MPV 12.0 8.9 - 12.9 FL    NRBC 0.0 0  WBC    ABSOLUTE NRBC 0.00 0.00 - 0.01 K/uL    NEUTROPHILS 71 32 - 75 %    LYMPHOCYTES 20 12 - 49 %    MONOCYTES 7 5 - 13 %    EOSINOPHILS 1 0 - 7 %    BASOPHILS 1 0 - 1 %    IMMATURE GRANULOCYTES 0 0.0 - 0.5 %    ABS. NEUTROPHILS 9.3 (H) 1.8 - 8.0 K/UL    ABS. LYMPHOCYTES 2.6 0.8 - 3.5 K/UL    ABS. MONOCYTES 0.9 0.0 - 1.0 K/UL    ABS. EOSINOPHILS 0.1 0.0 - 0.4 K/UL    ABS. BASOPHILS 0.1 0.0 - 0.1 K/UL    ABS. IMM. GRANS. 0.0 0.00 - 0.04 K/UL    DF AUTOMATED     METABOLIC PANEL, COMPREHENSIVE    Collection Time: 10/22/22  2:30 AM   Result Value Ref Range    Sodium 142 136 - 145 mmol/L    Potassium 4.4 3.5 - 5.1 mmol/L    Chloride 104 97 - 108 mmol/L    CO2 31 21 - 32 mmol/L    Anion gap 7 5 - 15 mmol/L    Glucose 73 65 - 100 mg/dL    BUN 18 6 - 20 MG/DL    Creatinine 0.88 0.70 - 1.30 MG/DL    BUN/Creatinine ratio 20 12 - 20      eGFR >60 >60 ml/min/1.73m2    Calcium 9.5 8.5 - 10.1 MG/DL    Bilirubin, total 0.3 0.2 - 1.0 MG/DL    ALT (SGPT) 29 12 - 78 U/L    AST (SGOT) 14 (L) 15 - 37 U/L    Alk.  phosphatase 68 45 - 117 U/L    Protein, total 7.6 6.4 - 8.2 g/dL    Albumin 4.4 3.5 - 5.0 g/dL    Globulin 3.2 2.0 - 4.0 g/dL    A-G Ratio 1.4 1.1 - 2.2     TROPONIN-HIGH SENSITIVITY    Collection Time: 10/22/22  2:30 AM   Result Value Ref Range    Troponin-High Sensitivity 10 0 - 76 ng/L   DRUG SCREEN, URINE    Collection Time: 10/22/22  3:18 AM   Result Value Ref Range    AMPHETAMINES Negative NEG      BARBITURATES Negative NEG      BENZODIAZEPINES Negative NEG      COCAINE Negative NEG      METHADONE Negative NEG      OPIATES Negative NEG      PCP(PHENCYCLIDINE) Negative NEG      THC (TH-CANNABINOL) Positive (A) NEG      Drug screen comment (NOTE)    TROPONIN-HIGH SENSITIVITY    Collection Time: 10/22/22  3:56 AM   Result Value Ref Range    Troponin-High Sensitivity 9 0 - 76 ng/L       Radiologic Studies -   CT HEAD WO CONT   Final Result   Normal study        CT Results  (Last 48 hours)                 10/22/22 0413  CT HEAD WO CONT Final result    Impression:  Normal study       Narrative:  EXAM: CT HEAD WO CONT       INDICATION: syncope, head trauma       COMPARISON: None. CONTRAST: None. TECHNIQUE: Unenhanced CT of the head was performed using 5 mm images. Brain and   bone windows were generated. Coronal and sagittal reformats. CT dose reduction   was achieved through use of a standardized protocol tailored for this   examination and automatic exposure control for dose modulation. FINDINGS:   The ventricles and sulci are normal in size, shape and configuration. . There is   no significant white matter disease. There is no intracranial hemorrhage,   extra-axial collection, or mass effect. The basilar cisterns are open. No CT   evidence of acute infarct. The bone windows demonstrate no abnormalities. The visualized portions of the   paranasal sinuses and mastoid air cells are clear. CXR Results  (Last 48 hours)      None            Medical Decision Making   IShari MD-- am the first provider for this patient, and I am the attending of record for this patient encounter. I reviewed the vital signs, available nursing notes, past medical history, past surgical history, family history and social history. Vital Signs-Reviewed the patient's vital signs. Patient Vitals for the past 24 hrs:   Temp Pulse Resp BP SpO2   10/22/22 0547 98.1 °F (36.7 °C) 70 18 127/78 99 %       EKG interpretation: (Preliminary)  Normal sinus rhythm with sinus arrhythmia.   Minimal criteria met for LVH--possibly normal variant considering young age and body habitus. No prior EKGs for comparison. QTc normal. EKG interpretation by Kathy Vigil MD    Records Reviewed: Nursing Notes, Old Medical Records, and Previous Radiology Studies    Provider Notes (Medical Decision Making):   DDx: Orthostatic syncope, vasovagal syncope, transient paroxysmal arrhythmia, dehydration, sleep deprivation, ACS, EtOH/tox effects, etc.    Plan: EKG, troponin x2, CBC, CMP, EtOH level, urine drug screen, CT head. Work-up in the ED is largely unremarkable aside from slightly increased WBC count of 13-nonspecific, possibly related to stress reaction. Urine drug screen positive for THC. Patient's echocardiogram from 2020 was reviewed by myself, noted normal findings. Reassuring and rules out possibilities such as hypertrophic cardiomyopathy. Considering patient is completely asymptomatic at this time, I feel he is stable for discharge. Will refer to cardiology for more in-depth evaluation considering patient has had 2 syncopal events within recent months. Though both episodes appear to have occurred after significant sleep deprivation, likely the precipitant of both events. He felt comfortable with plan for dc. Return precautions discussed. ED Course:   Initial assessment performed. The patient's presenting problems have been discussed, and they are in agreement with the care plan formulated and outlined with them. I have encouraged them to ask questions as they arise throughout their visit. Kathy Vigil MD      Disposition:  DC      DISCHARGE PLAN:  1. Discharge Medication List as of 10/22/2022  5:41 AM        2.    Follow-up Information       Follow up With Specialties Details Why Contact Info    [facility]  Schedule an appointment as soon as possible for a visit   [facility] [address] [place] [address] [facility] Schedule an appointment as soon as possible for a visit   [address]    [facility]  Schedule an appointment as soon as possible for a visit   [address]  [phone]          3. Return to ED if worse     Diagnosis     Clinical Impression:   1. Syncope and collapse    2. Sleep deprivation        Attestations:    Eugene Auguste MD    Please note that this dictation was completed with [employer], the computer voice recognition software. Quite often unanticipated grammatical, syntax, homophones, and other interpretive errors are inadvertently transcribed by the computer software. Please disregard these errors. Please excuse any errors that have escaped final proofreading. Thank you.

## 2022-10-22 NOTE — ED TRIAGE NOTES
Emergency Room Nursing Note        Patient Name: Madhu Schwarz      : 1995             MRN: [id number]      Chief Complaint:  Dizziness, Fall, and Nausea      Admit Diagnosis: No admission diagnoses are documented for this encounter. Admitting Provider: No admitting provider for patient encounter. Surgery: * No surgery found *           Patient arrived to the ER ambulatory from a club with complaints of Syncopal Episode x 4 wherein he fell x2, patient states he hit his head and LOC. Pt discloses taking Humira d/t Psoriatic Arthritis.           Lines:        Signed by: Maykel Troy RN, BENITO, BSN, [facility]                                              10/22/2022 at 12:31 AM

## 2023-05-18 RX ORDER — IBUPROFEN 600 MG/1
600 TABLET ORAL EVERY 6 HOURS PRN
COMMUNITY
Start: 2019-09-01

## 2023-05-18 RX ORDER — ADALIMUMAB 40MG/0.4ML
KIT SUBCUTANEOUS
COMMUNITY
Start: 2020-10-20